# Patient Record
Sex: MALE | Race: OTHER | Employment: FULL TIME | URBAN - METROPOLITAN AREA
[De-identification: names, ages, dates, MRNs, and addresses within clinical notes are randomized per-mention and may not be internally consistent; named-entity substitution may affect disease eponyms.]

---

## 2017-01-24 ENCOUNTER — HOSPITAL ENCOUNTER (EMERGENCY)
Age: 32
Discharge: ANOTHER HOSPITAL | End: 2017-01-24
Attending: EMERGENCY MEDICINE

## 2017-01-24 ENCOUNTER — HOSPITAL ENCOUNTER (EMERGENCY)
Age: 32
Discharge: HOME | End: 2017-01-24
Attending: EMERGENCY MEDICINE
Payer: SELF-PAY

## 2017-01-24 ENCOUNTER — APPOINTMENT (OUTPATIENT)
Dept: GENERAL RADIOLOGY | Age: 32
End: 2017-01-24
Attending: PHYSICIAN ASSISTANT
Payer: SELF-PAY

## 2017-01-24 VITALS
TEMPERATURE: 98.5 F | BODY MASS INDEX: 27.18 KG/M2 | HEART RATE: 64 BPM | SYSTOLIC BLOOD PRESSURE: 125 MMHG | RESPIRATION RATE: 14 BRPM | HEIGHT: 73 IN | DIASTOLIC BLOOD PRESSURE: 74 MMHG | OXYGEN SATURATION: 98 % | WEIGHT: 205.1 LBS

## 2017-01-24 VITALS
OXYGEN SATURATION: 97 % | BODY MASS INDEX: 31.16 KG/M2 | SYSTOLIC BLOOD PRESSURE: 144 MMHG | HEIGHT: 68 IN | WEIGHT: 205.6 LBS | RESPIRATION RATE: 18 BRPM | HEART RATE: 72 BPM | TEMPERATURE: 98.8 F | DIASTOLIC BLOOD PRESSURE: 84 MMHG

## 2017-01-24 DIAGNOSIS — L08.9 SKIN INFECTION: Primary | ICD-10-CM

## 2017-01-24 DIAGNOSIS — L88 SUPERFICIAL GRANULOMATOUS PYODERMA: Primary | ICD-10-CM

## 2017-01-24 PROCEDURE — 73140 X-RAY EXAM OF FINGER(S): CPT

## 2017-01-24 PROCEDURE — 99283 EMERGENCY DEPT VISIT LOW MDM: CPT

## 2017-01-24 RX ORDER — NAPROXEN 500 MG/1
500 TABLET ORAL 2 TIMES DAILY WITH MEALS
Qty: 20 TAB | Refills: 0 | Status: SHIPPED | OUTPATIENT
Start: 2017-01-24 | End: 2017-02-03

## 2017-01-24 NOTE — ED PROVIDER NOTES
HPI Comments: 29-year-old male otherwise healthy presents with a lesion to his right fifth digit over the past month to month and a half. No injury he has not seen any medical providers for this issue. The patient works in construction and notes worsening pain with excessive use of his hands. No drainage no injury sustained. Patient is a 28 y.o. male presenting with skin problem. Skin Problem           History reviewed. No pertinent past medical history. History reviewed. No pertinent past surgical history. History reviewed. No pertinent family history. Social History     Social History    Marital status:      Spouse name: N/A    Number of children: N/A    Years of education: N/A     Occupational History    Not on file. Social History Main Topics    Smoking status: Never Smoker    Smokeless tobacco: Not on file    Alcohol use Not on file    Drug use: Not on file    Sexual activity: Not on file     Other Topics Concern    Not on file     Social History Narrative         ALLERGIES: Review of patient's allergies indicates no known allergies. Review of Systems   All other systems reviewed and are negative. Vitals:    01/24/17 1752   BP: 144/84   Pulse: 72   Resp: 18   Temp: 98.8 °F (37.1 °C)   SpO2: 97%   Weight: 93.3 kg (205 lb 9.6 oz)   Height: 5' 8\" (1.727 m)            Physical Exam   Constitutional: He is oriented to person, place, and time. He appears well-developed and well-nourished. HENT:   Head: Normocephalic and atraumatic. Neck: Normal range of motion. Neck supple. Cardiovascular: Normal rate, regular rhythm and normal heart sounds. Pulmonary/Chest: Effort normal and breath sounds normal.   Abdominal: Soft. There is no tenderness. Neurological: He is alert and oriented to person, place, and time. Skin: Skin is warm and dry. Nursing note and vitals reviewed.        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       ED Course       Procedures

## 2017-01-24 NOTE — UC NOTE
Pt sent to [facility] ED via POV for further evaluation.  Report given to Leatha Peirce

## 2017-01-24 NOTE — ED TRIAGE NOTES
Pt arrives from [facility] with abcess to finger x 1 month. + drainage. ? Joint involvement? John Santamaria

## 2017-01-24 NOTE — UC PROVIDER NOTE
Patient is a 28 y.o. male presenting with finger pain. The history is provided by the patient. Finger Pain    This is a new problem. Episode onset: 22 days ago. The problem occurs constantly. The problem has not changed since onset. The pain is present in the right fingers. The symptoms are aggravated by movement. Treatments tried: started as infection, wife stuck a needle in his finger to get the infection out. History reviewed. No pertinent past medical history. History reviewed. No pertinent past surgical history. History reviewed. No pertinent family history. Social History     Social History    Marital status:      Spouse name: N/A    Number of children: N/A    Years of education: N/A     Occupational History    Not on file. Social History Main Topics    Smoking status: Never Smoker    Smokeless tobacco: Not on file    Alcohol use Not on file    Drug use: Not on file    Sexual activity: Not on file     Other Topics Concern    Not on file     Social History Narrative    No narrative on file                ALLERGIES: Review of patient's allergies indicates no known allergies. Review of Systems   Constitutional: Negative for fever. Skin: Positive for color change and wound. Vitals:    01/24/17 1625   BP: 125/74   Pulse: 64   Resp: 14   Temp: 98.5 °F (36.9 °C)   SpO2: 98%   Weight: 93 kg (205 lb 1.6 oz)   Height: 6' 1\" (1.854 m)       Physical Exam   Constitutional: He is oriented to person, place, and time. Pulmonary/Chest: Effort normal.   Neurological: He is alert and oriented to person, place, and time. Skin: Skin is warm. Right hand 5th digit swollen, erythematous with lesion on side. Nursing note and vitals reviewed. MDM     Differential Diagnosis; Clinical Impression; Plan:     CLINICAL IMPRESSION:  Skin infection  (primary encounter diagnosis)    Plan:  1.  Pt sent to Er for Eval  2.   3.   Risk of Significant Complications, Morbidity, and/or Mortality:   Presenting problems: Moderate  Diagnostic procedures: Moderate  Management options:   Moderate  Progress:   Patient progress:  Stable      Procedures

## 2017-01-25 NOTE — DISCHARGE INSTRUCTIONS
We hope that we have addressed all of your medical concerns. The examination and treatment you received in the Emergency Department were for an emergent problem and were not intended as complete care. It is important that you follow up with your healthcare provider(s) for ongoing care. If your symptoms worsen or do not improve as expected, and you are unable to reach your usual health care provider(s), you should return to the Emergency Department. Today's healthcare is undergoing tremendous change, and patient satisfaction surveys are one of the many tools to assess the quality of medical care. You may receive a survey from the [employer] regarding your experience in the Emergency Department. I hope that your experience has been completely positive, particularly the medical care that I provided. As such, please participate in the survey; anything less than excellent does not meet my expectations or intentions. [facility] and [address] [place] [address] participate in nationally recognized quality of care measures. If your blood pressure is greater than 120/80, as reported below, we urge that you seek medical care to address the potential of high blood pressure, commonly known as hypertension. Hypertension can be hereditary or can be caused by certain medical conditions, pain, stress, or \"white coat syndrome. \"       Please make an appointment with your health care provider(s) for follow up of your Emergency Department visit. VITALS:   Patient Vitals for the past 8 hrs:   Temp Pulse Resp BP SpO2   01/24/17 1752 98.8 °F (37.1 °C) 72 18 144/84 97 %          Thank you for allowing us to provide you with medical care today. We realize that you have many choices for your emergency care needs. Please choose us in the future for any continued health care needs. Yehuda Ngo  [address] [place], [place] [address]. Office: [phone]            No results found for this or any previous visit (from the past 24 hour(s)). Xr 5th Finger Rt Min 2 V    Result Date: 1/24/2017  EXAM:  XR 5TH FINGER RT MIN 2 V INDICATION:   Fifth digit abscess for one month. COMPARISON: None. FINDINGS: Three views of the right fifth finger demonstrate no acute fracture or bony erosion. There is soft tissue edema and irregularity adjacent to the fifth digit proximal interphalangeal joint. There is no soft tissue gas. IMPRESSION:   No acute bony abnormality. Soft tissue edema and irregularity of the fifth digit without soft tissue gas.

## 2017-04-13 ENCOUNTER — HOSPITAL ENCOUNTER (EMERGENCY)
Age: 32
Discharge: HOME | End: 2017-04-13
Attending: EMERGENCY MEDICINE
Payer: SELF-PAY

## 2017-04-13 ENCOUNTER — APPOINTMENT (OUTPATIENT)
Dept: CT IMAGING | Age: 32
End: 2017-04-13
Attending: PHYSICIAN ASSISTANT
Payer: SELF-PAY

## 2017-04-13 ENCOUNTER — APPOINTMENT (OUTPATIENT)
Dept: GENERAL RADIOLOGY | Age: 32
End: 2017-04-13
Attending: PHYSICIAN ASSISTANT
Payer: SELF-PAY

## 2017-04-13 VITALS
HEIGHT: 72 IN | TEMPERATURE: 98.7 F | HEART RATE: 68 BPM | DIASTOLIC BLOOD PRESSURE: 81 MMHG | RESPIRATION RATE: 16 BRPM | BODY MASS INDEX: 28.34 KG/M2 | SYSTOLIC BLOOD PRESSURE: 136 MMHG | OXYGEN SATURATION: 95 % | WEIGHT: 209.25 LBS

## 2017-04-13 DIAGNOSIS — S81.811A LACERATION OF LEG, RIGHT, INITIAL ENCOUNTER: ICD-10-CM

## 2017-04-13 DIAGNOSIS — S82.101A CLOSED FRACTURE OF PROXIMAL END OF RIGHT TIBIA, UNSPECIFIED FRACTURE MORPHOLOGY, INITIAL ENCOUNTER: Primary | ICD-10-CM

## 2017-04-13 PROCEDURE — 74011250636 HC RX REV CODE- 250/636: Performed by: PHYSICIAN ASSISTANT

## 2017-04-13 PROCEDURE — 75810000294 HC INTERM/LAYERED WND RPR

## 2017-04-13 PROCEDURE — 90715 TDAP VACCINE 7 YRS/> IM: CPT | Performed by: PHYSICIAN ASSISTANT

## 2017-04-13 PROCEDURE — 77030031139 HC SUT VCRL2 J&J -A

## 2017-04-13 PROCEDURE — 74011000250 HC RX REV CODE- 250: Performed by: PHYSICIAN ASSISTANT

## 2017-04-13 PROCEDURE — 90471 IMMUNIZATION ADMIN: CPT

## 2017-04-13 PROCEDURE — 77030031132 HC SUT NYL COVD -A

## 2017-04-13 PROCEDURE — 73590 X-RAY EXAM OF LOWER LEG: CPT

## 2017-04-13 PROCEDURE — 73700 CT LOWER EXTREMITY W/O DYE: CPT

## 2017-04-13 PROCEDURE — 77030018836 HC SOL IRR NACL ICUM -A

## 2017-04-13 PROCEDURE — 99284 EMERGENCY DEPT VISIT MOD MDM: CPT

## 2017-04-13 PROCEDURE — 74011250637 HC RX REV CODE- 250/637: Performed by: PHYSICIAN ASSISTANT

## 2017-04-13 PROCEDURE — L1830 KO IMMOB CANVAS LONG PRE OTS: HCPCS

## 2017-04-13 RX ORDER — BACITRACIN 500 UNIT/G
1 PACKET (EA) TOPICAL
Status: COMPLETED | OUTPATIENT
Start: 2017-04-13 | End: 2017-04-13

## 2017-04-13 RX ORDER — OXYCODONE AND ACETAMINOPHEN 5; 325 MG/1; MG/1
2 TABLET ORAL
Status: COMPLETED | OUTPATIENT
Start: 2017-04-13 | End: 2017-04-13

## 2017-04-13 RX ORDER — OXYCODONE AND ACETAMINOPHEN 5; 325 MG/1; MG/1
1 TABLET ORAL
Qty: 20 TAB | Refills: 0 | Status: SHIPPED | OUTPATIENT
Start: 2017-04-13

## 2017-04-13 RX ORDER — NAPROXEN 500 MG/1
500 TABLET ORAL
Qty: 20 TAB | Refills: 0 | Status: SHIPPED | OUTPATIENT
Start: 2017-04-13 | End: 2017-05-01

## 2017-04-13 RX ORDER — LIDOCAINE HYDROCHLORIDE AND EPINEPHRINE 10; 10 MG/ML; UG/ML
1.5 INJECTION, SOLUTION INFILTRATION; PERINEURAL ONCE
Status: COMPLETED | OUTPATIENT
Start: 2017-04-13 | End: 2017-04-13

## 2017-04-13 RX ADMIN — LIDOCAINE HYDROCHLORIDE,EPINEPHRINE BITARTRATE 15 MG: 10; .01 INJECTION, SOLUTION INFILTRATION; PERINEURAL at 20:28

## 2017-04-13 RX ADMIN — TETANUS TOXOID, REDUCED DIPHTHERIA TOXOID AND ACELLULAR PERTUSSIS VACCINE, ADSORBED 0.5 ML: 5; 2.5; 8; 8; 2.5 SUSPENSION INTRAMUSCULAR at 20:28

## 2017-04-13 RX ADMIN — OXYCODONE HYDROCHLORIDE AND ACETAMINOPHEN 2 TABLET: 5; 325 TABLET ORAL at 20:28

## 2017-04-13 RX ADMIN — BACITRACIN 1 PACKET: 500 OINTMENT TOPICAL at 20:29

## 2017-04-13 NOTE — ED TRIAGE NOTES
Pt Georgian speaking, interrupter phone in use for triage. Pt states that he fell off a ladder \"it wasn't very high\", pt hit his leg on the ladder causing a laceration to his right shin. Pt has his shin bandaged in triage. Pt denies hitting his head and denies any other injury.

## 2017-04-14 NOTE — ED PROVIDER NOTES
HPI Comments: 27 yo  male here for evaluation of right lower leg injury. States he fell from a ladder approximately 5-6 feet. Pain below right knee and laceration to right shin. Denies striking head or neck. No LOC; no N/V or changes in mentation. No pain to hip or back. No abd pain. Denies any additional injuries. TD Unknown. Patient is a 28 y.o. male presenting with skin laceration. The history is provided by the patient. The history is limited by a language barrier. A  was used. Laceration    The incident occurred 1 to 2 hours ago. Pain location: Right lower leg. The laceration is 4 cm in size. The pain is at a severity of 8/10. The pain is moderate. The pain has been constant since onset. Pertinent negatives include no numbness and no weakness. It is unknown when the patient last had a tetanus shot. History reviewed. No pertinent past medical history. History reviewed. No pertinent surgical history. History reviewed. No pertinent family history. Social History     Social History    Marital status:      Spouse name: N/A    Number of children: N/A    Years of education: N/A     Occupational History    Not on file. Social History Main Topics    Smoking status: Never Smoker    Smokeless tobacco: Not on file    Alcohol use No    Drug use: Not on file    Sexual activity: Not on file     Other Topics Concern    Not on file     Social History Narrative         ALLERGIES: Review of patient's allergies indicates no known allergies. Review of Systems   Constitutional: Negative. HENT: Negative for ear discharge. Eyes: Negative for photophobia, pain, discharge and visual disturbance. Respiratory: Negative for apnea, cough, chest tightness and shortness of breath. Cardiovascular: Negative for chest pain, palpitations and leg swelling. Gastrointestinal: Negative for abdominal distention, abdominal pain and blood in stool. Genitourinary: Negative for difficulty urinating, dysuria, flank pain, frequency and hematuria. Musculoskeletal: Positive for gait problem and myalgias. Negative for back pain and neck pain. Skin: Positive for wound. Negative for pallor. Neurological: Negative for dizziness, syncope, weakness, numbness and headaches. Psychiatric/Behavioral: Negative for behavioral problems and confusion. The patient is not nervous/anxious. Vitals:    04/13/17 2002   BP: 151/86   Pulse: 71   Resp: 16   Temp: 99.8 °F (37.7 °C)   SpO2: 96%   Weight: 94.9 kg (209 lb 4 oz)   Height: 6' (1.829 m)            Physical Exam   Constitutional: He is oriented to person, place, and time. He appears well-developed and well-nourished. He appears distressed (mild). HENT:   Head: Normocephalic and atraumatic. Right Ear: External ear normal.   Left Ear: External ear normal.   Nose: Nose normal.   Mouth/Throat: Oropharynx is clear and moist.   Eyes: Conjunctivae and EOM are normal. Pupils are equal, round, and reactive to light. Right eye exhibits no discharge. Left eye exhibits no discharge. Neck: Normal range of motion. Neck supple. Cardiovascular: Normal rate, regular rhythm, normal heart sounds and intact distal pulses. Pulmonary/Chest: Effort normal and breath sounds normal.   Abdominal: Soft. Bowel sounds are normal. He exhibits no distension. There is no tenderness. There is no rebound and no guarding. Musculoskeletal: He exhibits tenderness. Right hip: Normal.        Right knee: Tenderness found. Right ankle: Normal.        Cervical back: Normal.        Thoracic back: Normal.        Lumbar back: Normal.        Right lower leg: He exhibits tenderness and laceration. Legs:  Neurological: He is alert and oriented to person, place, and time. No cranial nerve deficit. Coordination normal.   Skin: Skin is warm and dry. No rash noted. Psychiatric: He has a normal mood and affect.  His behavior is normal. Judgment and thought content normal.   Nursing note and vitals reviewed. MDM  Number of Diagnoses or Management Options  Closed fracture of proximal end of right tibia, unspecified fracture morphology, initial encounter:   Laceration of leg, right, initial encounter:   Diagnosis management comments: 27 yo Mauritanian speaking male with right lower leg injury secondary to fall 5-6 ft; pain to right prox tib fib; abdnormal xray finding at site of pain despite negative reading; CT ordered and noted to have small fx; will splint and have Ortho followup; sutures placed to wound. Dr Annel Medina agrees with plan. EMERY Mccollum         Amount and/or Complexity of Data Reviewed  Tests in the radiology section of CPT®: ordered and reviewed  Discuss the patient with other providers: yes  Independent visualization of images, tracings, or specimens: yes      ED Course       Wound Repair  Date/Time: 4/13/2017 9:15 PM  Performed by: PAPreparation: skin prepped with ChloraPrep, skin prepped with Betadine and sterile field established  Pre-procedure re-eval: Immediately prior to the procedure, the patient was reevaluated and found suitable for the planned procedure and any planned medications. Location details: right leg  Wound length:2.6 - 7.5 cm  Anesthesia: local infiltration    Anesthesia:  Anesthesia: local infiltration  Local Anesthetic: lidocaine 1% with epinephrine   Anesthetic total: 10 mL  Foreign bodies: no foreign bodies  Irrigation solution: saline (with betadine)  Irrigation method: jet lavage  Debridement: none  Skin closure: 4-0 nylon (8)  Subcutaneous closure: Vicryl (3 vicryl rapide)  Number of sutures: 11  Technique: simple and interrupted  Approximation: close  Dressing: antibiotic ointment and gauze roll (non-adhesive dressing)  Patient tolerance: Patient tolerated the procedure well with no immediate complications  My total time at bedside, performing this procedure was 16-30 minutes.   Comments: Wound scrubbed with CHG sponge       Patient has been reassessed. Feeling better. Reviewed medications and radiographics with patient. Ready to discharge home. Discussed case with attending Physician Willey Duane. Agrees with care and will D/C with follow up. Splint applied by EMERY Alfaro. Neurovascular status intact post splint application. Patient's results have been reviewed with them. Patient and/or family have verbally conveyed their understanding and agreement of the patient's signs, symptoms, diagnosis, treatment and prognosis and additionally agree to follow up as recommended or return to the Emergency Room should their condition change prior to follow-up. Discharge instructions have also been provided to the patient with some educational information regarding their diagnosis as well a list of reasons why they would want to return to the ER prior to their follow-up appointment should their condition change.   EMERY Alfaro

## 2017-04-14 NOTE — DISCHARGE INSTRUCTIONS
Morrison: Instrucciones de cuidado - [ Cuts: Care Instructions ]  Instrucciones de cuidado  Un maximus puede ocurrir en cualquier parte del cuerpo. A veces, se usan puntos de sutura, grapas, [place] para la piel o cintas Jaama 45 llamadas Steri-Strips para mantener juntos los bordes de un maximus y ayudar a que sane. Las cintas Steri-Strip pueden usarse por sí solas o junto con puntos de sutura o grapas. Otras veces, se michelle los morrison abiertos. Si el maximus es profundo y [employer], es posible que el médico haya cerrado el maximus en dos capas. Mike capa más profunda de puntos de sutura junta la parte profunda del maximus. Estos puntos se disuelven y no es necesario extraerlos. El cierre de la capa superior, que puede [place] por medio de puntos, [place], Steri-Strips o [place], es lo que se puede marin sobre el maximus. Con frecuencia, un maximus se cubre con mike venda. El médico lo ha examinado minuciosamente, jorden pueden presentarse problemas más tarde. Si nota algún problema o nuevos síntomas, busque tratamiento médico de inmediato. La atención de seguimiento es mike parte clave de rivera tratamiento y seguridad. Asegúrese de hacer y acudir a todas las citas, y llame a rivera médico si está teniendo problemas. También es mike buena idea saber los resultados de levy exámenes y mantener mike lista de los medicamentos que marina. ¿Cómo puede cuidarse en el hogar? Si un maximus está abierto o cerrado  · Apoye la bev afectada sobre mike almohada en cualquier momento que esté sentado o acostado alexandrea los 3 días siguientes. Trate de mantenerla por encima del nivel del corazón. [place] ayudará a reducir la hinchazón. · Mantenga la herida seca alexandrea las primeras 24 a 48 horas. Después de [address], puede ducharse si el médico lo [place]. Seque el maximus con toques suaves de toalla. · No remoje el maximus, anisha en mike ari (bañera). Rivera médico le indicará cuándo es seguro mojar el maximus.   · [address] primeras 24 a 48 horas, limpie el maximus con agua y jabón 2 veces al día, a menos que el médico le dé indicaciones diferentes. ¨ No use peróxido de hidrógeno (agua [place]) ni alcohol, los cuales pueden retrasar la sanación. ¨ Puede cubrir el maximus con mike capa delgada de vaselina y mike venda antiadherente. ¨ Si el médico colocó mike venda sobre el maximus, colóquese mike venda nueva después de limpiar el maximus o si la venda se moja o se ensucia. · Evite cualquier actividad que pudiera hacer que el maximus se joão de nuevo. · Sea eli con los medicamentos. Nanci y siga todas las indicaciones de la [place]. ¨ Si el médico le recetó un analgésico (medicamento para el dolor), tómelo según las indicaciones. ¨ Si no está tomando un analgésico recetado, pregúntele a doshi médico si puede ramon yessica de [employer]. Si se cerró el maximus con puntos, grapas o Steri-Strips  · Siga las instrucciones anteriores para los morrison abiertos o cerrados. · No se quite los puntos o las grapas por sí mismo. El médico le indicará cuándo debe volver para que le quiten los [address] grapas. · Deje puestas las Steri-Strips hasta que se desprendan por sí solas. Si se cerró el maximus con un adhesivo para la piel  · Siga las instrucciones anteriores para los morrison abiertos o cerrados. · Déjese puesto el [employer] sobre la piel hasta que se desprenda por sí solo. Mathis puede tardar entre 5 y 7 días. · No se rasque, frote ni hurgue el [employer]. · No se aplique la parte pegajosa de mike venda directamente sobre el [employer]. · No se aplique ningún tipo de pomada, crema o loción sobre la bev. Mathis puede hacer que el [employer] se desprenda demasiado pronto. No use peróxido de hidrógeno (agua [place]) ni alcohol, los cuales pueden retrasar la sanación. ¿Cuándo debe pedir ayuda? Llame a doshi médico ahora mismo o busque atención médica inmediata si:  · Tiene dolor nuevo, o el dolor empeora.   · La piel cerca del maximus está fría o pálida, o cambia de color.  · Siente hormigueo, debilitamiento o entumecimiento cerca del maximus. · El maximus comienza a sangrar, y la marco empapa la venda. Es normal que salga mike pequeña cantidad de [place]. · Tiene dificultades para  la bev cercana al maximus. · Tiene síntomas de infección, tales anisha:  ¨ Aumento del dolor, la hinchazón, la temperatura o el enrojecimiento alrededor del maximus. ¨ Vetas rojizas que comienzan en el maximus. ¨ Pus que sale del maximus. [place]. Vigile de cerca los cambios en rivera pierce y asegúrese de comunicarse con rivera médico si:  · El maximus vuelve a abrirse. · No mejora anisha se esperaba. ¿Dónde puede encontrar más información en inglés? Sonia Olson a http://monica-amry.info/. Naveed Marcelo M735 en la búsqueda para aprender más acerca de \"Li: Instrucciones de cuidado - [ Cuts: Care Instructions ]. \"  Revisado: 27 berg, 2016  Versión del contenido: 11.2  © 9418-5563 Healthwise, Incorporated. Las instrucciones de cuidado fueron adaptadas bajo licencia por [employer] (which disclaims liability or warranty for this information). Si usted tiene [place] afección médica o sobre estas instrucciones, siempre pregunte a rivera profesional de pierce. Healthwise, Incorporated niega toda garantía o responsabilidad por rivera uso de esta información. Fractura de la parte inferior de la pierna: Instrucciones de cuidado - [ Broken Lower Leg: Care Instructions ]  Instrucciones de cuidado    El tratamiento de rivera pierna fracturada dependerá de la gravedad de la fractura. Es posible que rivera médico le haya puesto mike tablilla (férula) o un yeso en la parte inferior de la pierna para que pueda sanar o para mantenerla estable hasta que morelia a otro médico. Rivera pierna podría tardar semanas o meses en sanar. Con algunos cuidados en el hogar puede ayudar a que sane. Usted sanará mejor si cuida loyda de sí mismo. Coma mike variedad de alimentos saludables y no fume.   [address] es mike parte clave de doshi tratamiento y seguridad. Asegúrese de hacer y acudir a todas las citas, y llame a doshi médico si está teniendo problemas. También es mike buena idea saber los resultados de los exámenes y mantener mike lista de los medicamentos que marina. ¿Cómo puede cuidarse en el hogar? · Colóquese hielo o mike compresa fría en la parte inferior de la pierna alexandrea 10 a 20 minutos cada vez. Trate de hacerlo cada 1 a 2 horas alexandrea los siguientes 3 días (cuando esté despierto). Póngase un paño adams entre el hielo y el yeso o la tablilla. Mantenga seco el yeso o la tablilla. · Siga las indicaciones que le haya dado doshi médico para el cuidado del yeso. Si tiene mike tablilla, no se la quite a menos que doshi médico se lo indique. · Sea eli con los medicamentos. [place] los analgésicos (medicamentos para el dolor) exactamente según las indicaciones. ¨ Si el médico le recetó un analgésico, tómelo según las indicaciones. ¨ Si no está tomando un analgésico recetado, pregúntele a doshi médico si puede ramon un medicamento de [address] · No cargue [employer] pierna a menos que doshi médico se lo indique. Use muletas para caminar. · Eleve la pierna sobre almohadas mientras esté sentado o acostado en los primeros días siguientes a la lesión. Mantenga la pierna por encima del nivel del corazón. [place] ayudará a reducir la hinchazón. · Siga las indicaciones sobre ejercicios para mantener la pierna lorenzo. · Mueva los dedos de los pies con frecuencia para reducir la hinchazón y la rigidez. ¿Cuándo debe pedir ayuda? Llame al 911 en cualquier momento que considere que necesita atención de [place]. Por ejemplo, llame si:  · Tiene dolor repentino en el pecho y falta de [place], o tose marco. Llame a doshi médico ahora mismo o busque atención médica inmediata si:  · Doshi dolor ha aumentado o es muy intenso. · Doshi pie está frío o pálido o cambia de color.   · Siente hormigueo, debilitamiento o entumecimiento en los dedos del pie.  · Siente que el yeso o la tablilla está demasiado apretado. · No puede  los dedos de los pies. · Tiene señales de un coágulo de [place], tales anisha:  ¨ Dolor en la pantorrilla, el muslo, la samuel o detrás de la rodilla. ¨ Enrojecimiento e hinchazón en la pierna o la samuel. · Siente ardor o picazón en la piel cubierta por el yeso o la tablilla. Preste especial atención a los cambios en doshi pierce y asegúrese de comunicarse con doshi médico si:  · No mejora anisha se esperaba. ¿Dónde puede encontrar más información en inglés? Kiet Esteves a http://monica-mary.info/. Escriba L198 en la búsqueda para aprender más acerca de \"Fractura de la parte inferior de la pierna: Instrucciones de cuidado - [ Broken Lower Leg: Care Instructions ]. \"  Revisado: 27 berg, 2016  Versión del contenido: 11.2  © 9493-1965 Healthwise, Incorporated. Las instrucciones de cuidado fueron adaptadas bajo licencia por [employer] (which disclaims liability or warranty for this information). Si usted tiene [place] afección médica o sobre estas instrucciones, siempre pregunte a doshi profesional de pierce. Healthwise, Incorporated niega toda garantía o responsabilidad por doshi uso de esta información. We hope that we have addressed all of your medical concerns. The examination and treatment you received in the Emergency Department were for an emergent problem and were not intended as complete care. It is important that you follow up with your healthcare provider(s) for ongoing care. If your symptoms worsen or do not improve as expected, and you are unable to reach your usual health care provider(s), you should return to the Emergency Department. Today's healthcare is undergoing tremendous change, and patient satisfaction surveys are one of the many tools to assess the quality of medical care.   You may receive a survey from the [place] organization regarding your experience in the Emergency Department. I hope that your experience has been completely positive, particularly the medical care that I provided. As such, please participate in the survey; anything less than excellent does not meet my expectations or intentions. [facility] participate in nationally recognized quality of care measures. If your blood pressure is greater than 120/80, as reported below, we urge that you seek medical care to address the potential of high blood pressure, commonly known as hypertension. Hypertension can be hereditary or can be caused by certain medical conditions, pain, stress, or \"white coat syndrome. \"       Please make an appointment with your health care provider(s) for follow up of your Emergency Department visit. VITALS:   Patient Vitals for the past 8 hrs:   Temp Pulse Resp BP SpO2   04/13/17 2002 99.8 °F (37.7 °C) 71 16 151/86 96 %          Thank you for allowing us to provide you with medical care today. We realize that you have many choices for your emergency care needs. Please choose us in the future for any continued health care needs. George Castillo, [facility]: [phone]            No results found for this or any previous visit (from the past 24 hour(s)). Xr Tib/fib Rt    Result Date: 4/13/2017  EXAM:  XR TIB/FIB RT INDICATION:  Trauma. Kayencjoselito Blank off a ladder hitting his leg on the latter causing laceration of the shin. COMPARISON: None. FINDINGS: AP and lateral  views of the right tibia and fibula demonstrate no acute fracture or other acute osseous abnormality. There is small focal area of soft tissue damage along the anterior mid tibia without opaque foreign body identified within this region. .     IMPRESSION: No acute osseous abnormality or opaque foreign body.      Ct Low Ext Rt Wo Cont    Result Date: 4/13/2017  INDICATION:  Larwance Blank off ladder causing laceration to right shin EXAM: CT right lower leg. Comparison same day. Thin section axial images were obtained. From these sagittal and coronal reformats were performed. CT dose reduction was achieved through use of a standardized protocol tailored for this examination and automatic exposure control for dose modulation. Adaptive statistical iterative reconstruction (ASIR) was utilized. FINDINGS: There is laceration and gas within the soft tissues of the anterior right shin. There is a subtle cortical fracture along the anterior margin of the tibia removing a small sliver of cortical bone. This does not traverse the full width of the bone. With several small loose bodies in the adjacent soft tissues. Alignment is normal.     IMPRESSION: 1. Laceration to the anterior shin with small incomplete cortical based fracture of the anterior tibial margin.  This does not traverse the full width of the tibial shaft

## 2017-05-01 ENCOUNTER — HOSPITAL ENCOUNTER (EMERGENCY)
Age: 32
Discharge: HOME | End: 2017-05-01
Attending: FAMILY MEDICINE

## 2017-05-01 VITALS
SYSTOLIC BLOOD PRESSURE: 132 MMHG | BODY MASS INDEX: 31.19 KG/M2 | OXYGEN SATURATION: 98 % | HEART RATE: 70 BPM | RESPIRATION RATE: 18 BRPM | TEMPERATURE: 98.2 F | DIASTOLIC BLOOD PRESSURE: 76 MMHG | HEIGHT: 68 IN | WEIGHT: 205.8 LBS

## 2017-05-01 DIAGNOSIS — Z48.02 VISIT FOR SUTURE REMOVAL: Primary | ICD-10-CM

## 2017-05-01 NOTE — DISCHARGE INSTRUCTIONS
Aprenda acerca de la retirada de puntos de sutura y grapas - [ Concha Backers and Staples Removal ]  ¿Cuándo se retiran los puntos de sutura y las grapas? Doshi médico le indicará cuándo deben [employer] puntos de sutura o las grapas, por lo general al cabo de 7 a 14 cassius. El tiempo que tenga que esperar dependerá de cosas anisha la ubicación de la herida, el tamaño y la profundidad de la herida y doshi estado de pierce general. No se quite los puntos de sutura usted mismo. Los puntos en la daisy se suelen retirar al cabo de mike semana. Srini los puntos y las grapas en otras zonas del cuerpo, anisha en la espalda o el abdomen o sobre mike articulación, podrían tener que permanecer en doshi lugar por más [place], frecuentemente mike o Claude. Asegúrese de seguir las instrucciones de doshi médico.  ¿Cómo se retiran los puntos de sutura y las grapas? Generalmente no duele cuando el médico retira los puntos de sutura o las grapas. Usted puede sentir un pequeño tirón cuando se saca cada punto o grapa. · Estará sentado o recostado. · Para retirar los puntos de sutura, el médico utilizará tijeras para cortar cada yessica de los nudos y [employer] [facility] hilos. · Para retirar las grapas, el médico usará un instrumento para sacar las grapas mike por [place]. · La bev puede estar sensible después de aliica retirado los puntos de sutura o las grapas. Srini debería sentirse mejor al cabo de unos minutos o después de algunas horas. ¿Qué puede esperar después de que le retiren los puntos de sutura o las grapas? Dependiendo del tipo de maximus y de doshi ubicación, usted tendrá mike cicatriz. Las cicatrices por lo general son menos visibles con el tiempo. Mantenga limpia la bev, srini no se necesita mike venda. ¿Cuándo debe pedir ayuda? Llame a doshi médico ahora mismo o busque atención médica inmediata si:  · Siente un dolor nuevo o el dolor empeora. · Tiene dificultades para  la bev que rodea la cicatriz.   · Tiene síntomas de infección, tales anisha:  ¨ Mayor dolor, hinchazón, enrojecimiento o aumento de la temperatura alrededor de la cicatriz. ¨ Vetas rojizas que salen de la cicatriz. ¨ Pus que sale de la cicatriz. Maty Mourning. Preste especial atención a los cambios en doshi pierce y asegúrese de comunicarse con doshi médico si:  · Se le abre la cicatriz. · No mejora anisha se esperaba. La atención de seguimiento es mike parte clave de doshi tratamiento y seguridad. Asegúrese de hacer y acudir a todas las citas, y llame a doshi médico si está teniendo problemas. También es mike buena idea mantener mike lista de los medicamentos que marina. ¿Dónde puede encontrar más información en inglés? Atilio Valladares a http://monica-mary.info/. Nilda Doll E091 en la búsqueda para aprender más acerca de \"Aprenda acerca de la retirada de puntos de sutura y grapas - [ Learning About Stitches and Staples Removal ]. \"  Revisado: 27 [place], 2016  Versión del contenido: 11.2  © 4050-4593 Healthwise, Incorporated. Las instrucciones de cuidado fueron adaptadas bajo licencia por [employer] (which disclaims liability or warranty for this information). Si usted tiene [place] afección médica o sobre estas instrucciones, siempre pregunte a doshi profesional de pierce. Healthwise, Incorporated niega toda garantía o responsabilidad por doshi uso de esta información.

## 2017-05-01 NOTE — UC PROVIDER NOTE
Patient is a 28 y.o. male presenting with suture removal. The history is provided by the patient. A  was used ( # [id number]). Suture Removal   This is a new problem. Episode onset: Here for suture removal only. S/P fall from a ladder on 4/13/17. Nothing aggravates the symptoms. Nothing relieves the symptoms. History reviewed. No pertinent past medical history. History reviewed. No pertinent surgical history. History reviewed. No pertinent family history. Social History     Social History    Marital status:      Spouse name: N/A    Number of children: N/A    Years of education: N/A     Occupational History    Not on file. Social History Main Topics    Smoking status: Never Smoker    Smokeless tobacco: Not on file    Alcohol use No    Drug use: Not on file    Sexual activity: Not on file     Other Topics Concern    Not on file     Social History Narrative                ALLERGIES: Review of patient's allergies indicates no known allergies. Review of Systems   Constitutional: Negative. HENT: Negative. Eyes: Negative. Respiratory: Negative. Cardiovascular: Negative. Musculoskeletal: Negative. Skin: Positive for wound. Allergic/Immunologic: Negative. Neurological: Negative. Vitals:    05/01/17 1543   BP: 132/76   Pulse: 70   Resp: 18   Temp: 98.2 °F (36.8 °C)   SpO2: 98%   Weight: 93.4 kg (205 lb 12.8 oz)   Height: 5' 8\" (1.727 m)       Physical Exam   Constitutional: He is oriented to person, place, and time. He appears well-developed and well-nourished. HENT:   Head: Normocephalic and atraumatic. Right Ear: External ear normal.   Left Ear: External ear normal.   Nose: Nose normal.   Mouth/Throat: Oropharynx is clear and moist.   Eyes: Conjunctivae and EOM are normal. Pupils are equal, round, and reactive to light. Neck: Normal range of motion. Neck supple.    Cardiovascular: Normal rate, regular rhythm and normal heart sounds. Pulmonary/Chest: Effort normal and breath sounds normal.   Musculoskeletal: Normal range of motion. Neurological: He is alert and oriented to person, place, and time. Skin: Skin is warm and dry. Healing lacerations to anterior right lower leg. Nylon sutures are present. + wound separation of laceration  No erythema   Psychiatric: He has a normal mood and affect. His behavior is normal. Judgment and thought content normal.   Nursing note and vitals reviewed. MDM     Differential Diagnosis; Clinical Impression; Plan:     CLINICAL IMPRESSION:  Visit for suture removal  (primary encounter diagnosis)    Plan:  1. Suture removal. Looks good. 2.   3.   Risk of Significant Complications, Morbidity, and/or Mortality:   Presenting problems: Moderate  Diagnostic procedures: Moderate  Progress:   Patient progress:  Stable      Suture/Staple Removal  Date/Time: 5/1/2017 3:59 PM  Performed by: Epi Mora  Authorized by: JOSEE Kilpatrick     Consent:     Consent obtained:  Verbal    Consent given by:  Patient    Risks discussed:  Bleeding  Location:     Location: Right lower leg (anterior)  Procedure details:     Wound appearance:  Nontender (Noted healing laceration with separation of wound edges. )    Sutures removed: 8. Post-procedure details:     Post-removal:  No dressing applied    Patient tolerance of procedure:   Tolerated well, no immediate complications